# Patient Record
Sex: MALE | Race: WHITE | NOT HISPANIC OR LATINO | URBAN - METROPOLITAN AREA
[De-identification: names, ages, dates, MRNs, and addresses within clinical notes are randomized per-mention and may not be internally consistent; named-entity substitution may affect disease eponyms.]

---

## 2019-11-22 ENCOUNTER — OFFICE (OUTPATIENT)
Dept: URBAN - METROPOLITAN AREA CLINIC 78 | Facility: CLINIC | Age: 29
End: 2019-11-22

## 2019-11-22 VITALS
TEMPERATURE: 98.6 F | HEART RATE: 84 BPM | HEIGHT: 70 IN | DIASTOLIC BLOOD PRESSURE: 77 MMHG | WEIGHT: 141 LBS | SYSTOLIC BLOOD PRESSURE: 125 MMHG

## 2019-11-22 DIAGNOSIS — B18.2 CHRONIC VIRAL HEPATITIS C: ICD-10-CM

## 2019-11-22 PROCEDURE — 99244 OFF/OP CNSLTJ NEW/EST MOD 40: CPT

## 2019-11-22 NOTE — INTERFACERESULTNOTES
Hep C genotype 1a (most common type), no evidence of fibrosis/scarring of liver, immune to Hep B and without prior exposure, negative HIV test. Not immune to Hep A (would consider getting vaccinated for this). Can proceed with script for Epclusa (will task Meka Wu on this).

## 2019-11-22 NOTE — SERVICEHPINOTES
CHELI BURGESS   is a   29   year old male who is being seen in consultation at the request of   MONTY MARINA   for hepatitis C. Patient reportedly has GT 1a and is treatment-naive. He has h/o IVDU but has been clean for 5 years committed to staying clean. He rarely has alcohol. He is currently seeing an orthopedic specialist for spinal pain. He may have ankylosing spondylitis. He also has remote h/o Crohn's findings in 2008 but had another colonoscopy in 2011 with normal biopsies. We have the records in patient's chart but as we were discussing the 2 prior colonoscopies (since he has had weight loss and some blood per rectum recently), he became very upset stating he has only had one prior colonoscopy and he will never had it again. 11/4/19 plt 277, TSH wnl, AST/ALT [id number]/[id number]/2/19 Hep C ab >11

## 2019-11-23 LAB
HBSAG SCREEN: NEGATIVE
HCV FIBROSURE: ALPHA 2-MACROGLOBULINS, QN: 238 MG/DL (ref 110–276)
HCV FIBROSURE: ALT (SGPT) P5P: 229 IU/L — HIGH (ref 0–55)
HCV FIBROSURE: APOLIPOPROTEIN A-1: 180 MG/DL — HIGH (ref 101–178)
HCV FIBROSURE: BILIRUBIN, TOTAL: 0.3 MG/DL (ref 0–1.2)
HCV FIBROSURE: COMMENT: (no result)
HCV FIBROSURE: FIBROSIS SCORE: 0.13 (ref 0–0.21)
HCV FIBROSURE: FIBROSIS SCORING: (no result)
HCV FIBROSURE: FIBROSIS STAGE: (no result)
HCV FIBROSURE: GGT: 49 IU/L (ref 0–65)
HCV FIBROSURE: HAPTOGLOBIN: 95 MG/DL (ref 34–200)
HCV FIBROSURE: INTERPRETATIONS: (no result)
HCV FIBROSURE: LIMITATIONS: (no result)
HCV FIBROSURE: NECROINFLAMM ACTIVITY SCORING: (no result)
HCV FIBROSURE: NECROINFLAMMAT ACTIVITY GRADE: (no result)
HCV FIBROSURE: NECROINFLAMMAT ACTIVITY SCORE: 0.8 — HIGH (ref 0–0.17)
HCV GENOTYPING NON REFLEX: HEPATITIS C GENOTYPE: (no result)
HCV GENOTYPING NON REFLEX: PLEASE NOTE: (no result)
HCV RT-PCR, QUANT (NON-GRAPH): HCV LOG10: 6.17 LOG10 IU/ML
HCV RT-PCR, QUANT (NON-GRAPH): HEPATITIS C QUANTITATION: (no result) IU/ML
HCV RT-PCR, QUANT (NON-GRAPH): TEST INFORMATION: (no result)
HEP A AB, TOTAL: NEGATIVE
HEP B CORE AB, TOT: NEGATIVE
HEPATITIS B SURF AB QUANT: 14.5 MIU/ML (ref 9.9–?)
PANEL 083935: HIV SCREEN 4TH GENERATION WRFX: NON REACTIVE
PROTHROMBIN TIME (PT): INR: 1.1 (ref 0.8–1.2)
PROTHROMBIN TIME (PT): PROTHROMBIN TIME: 11.2 SEC (ref 9.1–12)